# Patient Record
Sex: FEMALE | Race: WHITE
[De-identification: names, ages, dates, MRNs, and addresses within clinical notes are randomized per-mention and may not be internally consistent; named-entity substitution may affect disease eponyms.]

---

## 2020-05-20 ENCOUNTER — HOSPITAL ENCOUNTER (EMERGENCY)
Dept: HOSPITAL 26 - MED | Age: 30
LOS: 1 days | Discharge: HOME | End: 2020-05-21
Payer: COMMERCIAL

## 2020-05-20 VITALS — HEIGHT: 63 IN | WEIGHT: 145 LBS | BODY MASS INDEX: 25.69 KG/M2

## 2020-05-20 VITALS — SYSTOLIC BLOOD PRESSURE: 116 MMHG | DIASTOLIC BLOOD PRESSURE: 63 MMHG

## 2020-05-20 DIAGNOSIS — Z88.2: ICD-10-CM
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
## 2020-05-20 DIAGNOSIS — M25.512: Primary | ICD-10-CM
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
## 2020-05-20 NOTE — NUR
VISUALIZED PTS SHOULDER POST CT SCAN. SHOULDER IS SWOLLEN BUT NO BRUSING 
VISULAIZED. PT HAS FACIAL GRIMMACING AND SAYS HER PAIN IS STILL 9/10.

## 2020-05-20 NOTE — NUR
28 YO F BIB SELF FOR C/C OF 9/10 SHARP LEFT SHOULDER PAIN SINCE 3 AM. PAIN 
RADIATES FROM LEFT SHOULDER TO LEFT LOWER ARM. DENIES NUMBNESS AND TINGELING. 
RADIAL PULSES ARE EQUAL AND REGULAR. PT FELL ON SHOULDER AT 3 AM AND WENT TO 
URGENT CARE WHERE SHE HAD AN X RAY. PT WAS TOLD SHE HAS A SCAPULA FRACTURE AND 
WAS TOLD TO COME TO ER FOR CT SCAN. DENIES TRAVEL, SOB, FEVER, AND COUGH. BED 
LOCKED AND IN LOWEST POSITION. 



ALLERGIES TO SULFA DRUGS

MED HX: LUPUS

RX: PLAQUENIL

## 2020-05-21 VITALS — DIASTOLIC BLOOD PRESSURE: 63 MMHG | SYSTOLIC BLOOD PRESSURE: 116 MMHG

## 2022-10-03 NOTE — NUR
Take antibiotics as prescribed  Complete the entire course  If you do not complete the entire course this may result in bacterial resistance making future infections very difficult or impossible to treat  You should never have leftover antibiotics unless your antibiotic is switched  Note that if you are taking birth control medications, antibiotics can decrease their effectiveness  Recommend abstinence or back up method while on antibiotics and for 3-5 days after completing the antibiotic course  We send all positive urine for culture, we will call you if your antibiotic needs to be changed based on culture results  If symptoms do not improve in 2-3 days, follow-up with your primary care provider  If you develop fever, chills, or worsening low back pain report to the emergency room   These are symptoms of a more serious condition or possible spread of the infection into your bloodstream  pt states her pain has reduced from 9/10 to 7/10 1 hour post norco